# Patient Record
Sex: FEMALE | Race: WHITE | NOT HISPANIC OR LATINO
[De-identification: names, ages, dates, MRNs, and addresses within clinical notes are randomized per-mention and may not be internally consistent; named-entity substitution may affect disease eponyms.]

---

## 2023-06-16 PROBLEM — Z00.00 ENCOUNTER FOR PREVENTIVE HEALTH EXAMINATION: Status: ACTIVE | Noted: 2023-06-16

## 2023-07-21 DIAGNOSIS — Z12.39 ENCOUNTER FOR OTHER SCREENING FOR MALIGNANT NEOPLASM OF BREAST: ICD-10-CM

## 2023-07-21 DIAGNOSIS — Z13.820 ENCOUNTER FOR SCREENING FOR OSTEOPOROSIS: ICD-10-CM

## 2023-07-25 ENCOUNTER — APPOINTMENT (OUTPATIENT)
Dept: OBGYN | Facility: CLINIC | Age: 63
End: 2023-07-25
Payer: COMMERCIAL

## 2023-07-25 VITALS
WEIGHT: 218 LBS | HEART RATE: 97 BPM | SYSTOLIC BLOOD PRESSURE: 140 MMHG | HEIGHT: 62 IN | OXYGEN SATURATION: 97 % | RESPIRATION RATE: 14 BRPM | DIASTOLIC BLOOD PRESSURE: 58 MMHG | BODY MASS INDEX: 40.12 KG/M2

## 2023-07-25 DIAGNOSIS — I10 ESSENTIAL (PRIMARY) HYPERTENSION: ICD-10-CM

## 2023-07-25 DIAGNOSIS — Z12.4 ENCOUNTER FOR SCREENING FOR MALIGNANT NEOPLASM OF CERVIX: ICD-10-CM

## 2023-07-25 DIAGNOSIS — Z13.31 ENCOUNTER FOR SCREENING FOR DEPRESSION: ICD-10-CM

## 2023-07-25 DIAGNOSIS — Z01.411 ENCOUNTER FOR GYNECOLOGICAL EXAMINATION (GENERAL) (ROUTINE) WITH ABNORMAL FINDINGS: ICD-10-CM

## 2023-07-25 DIAGNOSIS — Z12.11 ENCOUNTER FOR SCREENING FOR MALIGNANT NEOPLASM OF COLON: ICD-10-CM

## 2023-07-25 DIAGNOSIS — Z12.39 ENCOUNTER FOR OTHER SCREENING FOR MALIGNANT NEOPLASM OF BREAST: ICD-10-CM

## 2023-07-25 DIAGNOSIS — Z82.49 FAMILY HISTORY OF ISCHEMIC HEART DISEASE AND OTHER DISEASES OF THE CIRCULATORY SYSTEM: ICD-10-CM

## 2023-07-25 DIAGNOSIS — Z80.9 FAMILY HISTORY OF MALIGNANT NEOPLASM, UNSPECIFIED: ICD-10-CM

## 2023-07-25 PROCEDURE — 82270 OCCULT BLOOD FECES: CPT

## 2023-07-25 PROCEDURE — 99386 PREV VISIT NEW AGE 40-64: CPT | Mod: 25

## 2023-07-25 RX ORDER — TELMISARTAN AND HYDROCHLOROTHIAZIDE 40; 12.5 MG/1; MG/1
40-12.5 TABLET ORAL
Refills: 0 | Status: ACTIVE | COMMUNITY

## 2023-07-25 RX ORDER — DICLOFENAC POTASSIUM 50 MG/1
50 TABLET, COATED ORAL
Refills: 0 | Status: ACTIVE | COMMUNITY

## 2023-07-27 LAB
CYTOLOGY CVX/VAG DOC THIN PREP: NORMAL
HPV 16 E6+E7 MRNA CVX QL NAA+PROBE: NOT DETECTED
HPV18+45 E6+E7 MRNA CVX QL NAA+PROBE: NOT DETECTED

## 2023-07-27 NOTE — HISTORY OF PRESENT ILLNESS
[postmenopausal] : postmenopausal [N] : Patient does not use contraception [Y] : Positive pregnancy history [Yes] : pregnancy [Post-Menopause, No Sxs] : post-menopausal, currently without symptoms [No] : Patient does not have concerns regarding sex [Currently Active] : currently active [Men] : men [FreeTextEntry1] : PT presents today for an annual exam. She is doing well. \par The patient presents today for a routine GYN exam.  She offers no complaints.  We reviewed together in detail her past medical and surgical histories, allergies and medication usage, social and family history.   All questions were answered in easy to understand language.  [Mammogramdate] : 2022 [BreastSonogramDate] : 2022 [PapSmeardate] : 2022 [ColonoscopyDate] : 2022 [TextBox_78] : no hx [LMPDate] : 2010 [PGHxTotal] : 3 [[facility]] : 2 [PGHxAbortions] : 1 [[facility]] : 2 [TextBox_6] : 2010 [TextBox_9] : 12

## 2023-07-27 NOTE — PHYSICAL EXAM
[Chaperone Present] : A chaperone was present in the examining room during all aspects of the physical examination [Appropriately responsive] : appropriately responsive [Alert] : alert [No Acute Distress] : no acute distress [No Lymphadenopathy] : no lymphadenopathy [Soft] : soft [Non-tender] : non-tender [Non-distended] : non-distended [No HSM] : No HSM [No Lesions] : no lesions [No Mass] : no mass [Oriented x3] : oriented x3 [Examination Of The Breasts] : a normal appearance [No Masses] : no breast masses were palpable [Labia Majora] : normal [Labia Minora] : normal [Normal] : normal [Uterine Adnexae] : normal [FreeTextEntry1] : The documentation for this encounter was entered by Pineda Whipple acting as a scribe for Dr. Mckeon.  [Occult Blood Positive] : was negative for occult blood analysis

## 2023-08-15 ENCOUNTER — APPOINTMENT (OUTPATIENT)
Dept: OBGYN | Facility: CLINIC | Age: 63
End: 2023-08-15
Payer: COMMERCIAL

## 2023-08-15 VITALS
HEIGHT: 62 IN | BODY MASS INDEX: 38.46 KG/M2 | WEIGHT: 209 LBS | RESPIRATION RATE: 16 BRPM | HEART RATE: 72 BPM | SYSTOLIC BLOOD PRESSURE: 140 MMHG | OXYGEN SATURATION: 96 % | DIASTOLIC BLOOD PRESSURE: 48 MMHG

## 2023-08-15 DIAGNOSIS — R93.89 POSTMENOPAUSAL BLEEDING: ICD-10-CM

## 2023-08-15 DIAGNOSIS — N95.0 POSTMENOPAUSAL BLEEDING: ICD-10-CM

## 2023-08-15 PROCEDURE — 76830 TRANSVAGINAL US NON-OB: CPT

## 2023-08-15 PROCEDURE — ZZZZZ: CPT

## 2023-08-15 PROCEDURE — 76857 US EXAM PELVIC LIMITED: CPT

## 2023-08-15 PROCEDURE — 99213 OFFICE O/P EST LOW 20 MIN: CPT | Mod: 25

## 2023-08-15 RX ORDER — MISOPROSTOL 200 UG/1
200 TABLET ORAL
Qty: 2 | Refills: 0 | Status: ACTIVE | COMMUNITY
Start: 2023-08-15 | End: 1900-01-01

## 2023-08-15 RX ORDER — MISOPROSTOL 200 UG/1
200 TABLET ORAL ONCE
Qty: 2 | Refills: 0 | Status: ACTIVE | COMMUNITY
Start: 2023-08-15 | End: 1900-01-01

## 2023-08-15 NOTE — PHYSICAL EXAM
[Normal] : uterus [No Bleeding] : there was no active vaginal bleeding [Discharge] : had a ~M discharge [Moderate] : moderate [Brown] : brown [Bloody] : bloody [Uterine Adnexae] : were not tender and not enlarged [Chaperone Present] : A chaperone was present in the examining room during all aspects of the physical examination [FreeTextEntry1] : The documentation for this encounter was entered by Claire Palomo acting as a scribe for Dr. Mckeon.

## 2023-08-15 NOTE — COUNSELING
[FreeTextEntry2] : Postmenopausal bleeding was discussed in detail.  The endometrium measures slightly over 4 mm.  We discussed the likelihood albeit low of finding serious pathology a sampling of the endometrium is being recommended.  All questions were answered.  The patient desires to undergo an in office sampling and will schedule a hysteroscopy and endometrial biopsy.  A prescription for Cytotec was sent to her pharmacy and instructions were given.  All questions were answered

## 2023-08-21 ENCOUNTER — TRANSCRIPTION ENCOUNTER (OUTPATIENT)
Age: 63
End: 2023-08-21

## 2023-09-08 ENCOUNTER — APPOINTMENT (OUTPATIENT)
Dept: OBGYN | Facility: CLINIC | Age: 63
End: 2023-09-08
Payer: COMMERCIAL

## 2023-09-08 VITALS
HEART RATE: 72 BPM | OXYGEN SATURATION: 96 % | HEIGHT: 62 IN | SYSTOLIC BLOOD PRESSURE: 132 MMHG | BODY MASS INDEX: 37.91 KG/M2 | DIASTOLIC BLOOD PRESSURE: 80 MMHG | WEIGHT: 206 LBS

## 2023-09-08 DIAGNOSIS — N95.0 POSTMENOPAUSAL BLEEDING: ICD-10-CM

## 2023-09-08 PROCEDURE — 58558Z: CUSTOM

## 2023-09-08 NOTE — PROCEDURE
[Endometrial Biopsy] : Endometrial biopsy [Hysteroscopy] : Hysteroscopy [Betadine] : Betadine [___ mL Injected] : [unfilled] ~UmL of lidocaine [Specimen Collected] : collected [No Complications] : No complications [Post-Menop. Bleeding] : post-menopausal bleeding [Required Dilation] : required dilation [Consent Obtained] : Consent obtained [Postmenopausal bleeding] : postmenopausal bleeding [Sent to Pathology] : specimen was placed in buffered formalin and sent for pathology [Tolerated Well] : Patient tolerated the procedure well [Risks] : risks [Benefits] : benefits [Alternatives] : alternatives [Pretreatment with misoprostol] : pretreatment with misoprostol [Cytotec] : Cytotec [Lidocaine___ mL] : [unfilled] ~UmL of lidocaine [flexible] : Using aseptic technique a hysteroscopy was performed using a flexible hysteroscope [Antibiotics given] : antibiotics not given [de-identified] : Normal atrophic cavity

## 2023-09-08 NOTE — PLAN
[FreeTextEntry1] : A consent was read and signed by the PT prior to the procedure.   CONSENT TO OPERATION, ANESTHESIA, SEDATION, ANALGESIA, SPECIAL TREATMENT, OR PROCEDURE BLOOD TRANFUSION CONSENT Date:________ Time________ AM/PM    1.        Permission: I hereby authorize  _________________ and his/ her associates or assistants of his/ her choice at (the hospital) to perform the following procedure(s): ________________________________________________________________________________________________________________________________________________________   Including such as photographing, videotaping, televising, or other methods of visually recording the procedure(s) as may be purposeful for the advancement of medical knowledge and or education, with the understanding that my (the patients) identity will remain anonymous.    2.        Explanation of procedure(s), risk, benefits, and alternatives.    _________________ has fully explained to me the nature and purpose of the procedure(s) and has also informed me of expected benefits and complications (from known causes), attendant discomforts, and the risk that may arise, as well as possible alternative methods of diagnosis and/or treatment to the proposed procedure(s), including no treatment. I have been given an opportunity to ask questions, and all my questions have been answered fully and satisfactorily.    3.        Anesthesia: I further understand that anesthetics, sedatives, or analgesics as may be considered necessary and the type will be explained to me by the anesthesiologist prior to the surgery.    4.        Specimens: Any organs or tissues surgically removed may vbe examined and retained by the hospital medical, scientific, or educational purposes, and such tissues or part may be disposed of in accordance with customary practices and applicable state laws and regulations.    5.        Understanding of this form: I confirm that I have read this form, fully and understand its contents, and that all blank spaces above have been completed prior to my signing. I acknowledge that no guarantees or assurances have been made to me concerning the results intended from the procedure(s) described above.    _____________________                _________        _______________________________ Patient/Agent/Relative/Guardian        Date           Print Name             Relationship   _____________________                _________      ________________________________ Patient/Agent/Relative/Guardian        Date         Print Name              Relationship    I hereby certify that I have explained the nature, purpose, benefits, and risk of, and alternatives to the operations/procedure(s), have offered to explain any questions, and have fully answered all such questions. I believe that the patient/relative/guardian fully understands what I have explained and answered. In the event that I was not present when the patient signed this form, I understand that the form is only documentation that the informed consent process took place. I remain responsible for having obtained the consent from the patient   Date: _________________              Physician:_________________________________                                        Attending Surgeon: _________________________    All questions were answered.

## 2023-09-14 ENCOUNTER — TRANSCRIPTION ENCOUNTER (OUTPATIENT)
Age: 63
End: 2023-09-14

## 2023-09-14 LAB — CORE LAB BIOPSY: NORMAL

## 2023-09-19 ENCOUNTER — TRANSCRIPTION ENCOUNTER (OUTPATIENT)
Age: 63
End: 2023-09-19

## 2023-09-19 ENCOUNTER — NON-APPOINTMENT (OUTPATIENT)
Age: 63
End: 2023-09-19

## 2023-12-11 ENCOUNTER — NON-APPOINTMENT (OUTPATIENT)
Age: 63
End: 2023-12-11